# Patient Record
Sex: MALE | Race: WHITE
[De-identification: names, ages, dates, MRNs, and addresses within clinical notes are randomized per-mention and may not be internally consistent; named-entity substitution may affect disease eponyms.]

---

## 2021-03-10 ENCOUNTER — NURSE TRIAGE (OUTPATIENT)
Dept: OTHER | Facility: CLINIC | Age: 31
End: 2021-03-10

## 2021-03-11 NOTE — TELEPHONE ENCOUNTER
Patient called with covid testing related questions. Directed patient to [employer] web site. Care advice provided, patient verbalizes understanding; denies any other questions or concerns; instructed to call back for any new or worsening symptoms. This triage is a result of a call to [address] [place] [address]. Please do not respond to the triage nurse through this encounter. Any subsequent communication should be directly with the patient. Reason for Disposition   General information question, no triage required and triager able to answer question    Answer Assessment - Initial Assessment Questions  1. REASON FOR CALL or QUESTION: \"What is your reason for calling today? \" or \"How can I best help you? \" or \"What question do you have that I can help answer? \"      Covid testing related questions.     Protocols used: INFORMATION ONLY CALL - NO TRIAGE-ADULT-

## 2021-11-29 ENCOUNTER — OFFICE VISIT (OUTPATIENT)
Dept: INTERNAL MEDICINE CLINIC | Age: 31
End: 2021-11-29
Payer: COMMERCIAL

## 2021-11-29 ENCOUNTER — TELEPHONE (OUTPATIENT)
Dept: INTERNAL MEDICINE CLINIC | Age: 31
End: 2021-11-29

## 2021-11-29 ENCOUNTER — NURSE TRIAGE (OUTPATIENT)
Dept: OTHER | Facility: CLINIC | Age: 31
End: 2021-11-29

## 2021-11-29 VITALS
OXYGEN SATURATION: 96 % | WEIGHT: 128.8 LBS | HEIGHT: 71 IN | DIASTOLIC BLOOD PRESSURE: 70 MMHG | TEMPERATURE: 97.9 F | HEART RATE: 92 BPM | SYSTOLIC BLOOD PRESSURE: 130 MMHG | BODY MASS INDEX: 18.03 KG/M2

## 2021-11-29 DIAGNOSIS — F41.9 ANXIETY: Primary | ICD-10-CM

## 2021-11-29 DIAGNOSIS — F41.9 ANXIETY: ICD-10-CM

## 2021-11-29 DIAGNOSIS — R07.9 CHEST PAIN, UNSPECIFIED TYPE: ICD-10-CM

## 2021-11-29 DIAGNOSIS — R07.89 CHEST TIGHTNESS: Primary | ICD-10-CM

## 2021-11-29 PROCEDURE — 93000 ELECTROCARDIOGRAM COMPLETE: CPT | Performed by: HOSPITALIST

## 2021-11-29 PROCEDURE — 99213 OFFICE O/P EST LOW 20 MIN: CPT | Performed by: HOSPITALIST

## 2021-11-29 PROCEDURE — 1036F TOBACCO NON-USER: CPT | Performed by: HOSPITALIST

## 2021-11-29 PROCEDURE — G8419 CALC BMI OUT NRM PARAM NOF/U: HCPCS | Performed by: HOSPITALIST

## 2021-11-29 PROCEDURE — G8484 FLU IMMUNIZE NO ADMIN: HCPCS | Performed by: HOSPITALIST

## 2021-11-29 PROCEDURE — G8427 DOCREV CUR MEDS BY ELIG CLIN: HCPCS | Performed by: HOSPITALIST

## 2021-11-29 RX ORDER — TEMAZEPAM 15 MG/1
15 CAPSULE ORAL NIGHTLY PRN
Qty: 14 CAPSULE | Refills: 0 | Status: SHIPPED
Start: 2021-11-29 | End: 2021-12-02 | Stop reason: ALTCHOICE

## 2021-11-29 SDOH — ECONOMIC STABILITY: FOOD INSECURITY: WITHIN THE PAST 12 MONTHS, THE FOOD YOU BOUGHT JUST DIDN'T LAST AND YOU DIDN'T HAVE MONEY TO GET MORE.: NEVER TRUE

## 2021-11-29 SDOH — ECONOMIC STABILITY: FOOD INSECURITY: WITHIN THE PAST 12 MONTHS, YOU WORRIED THAT YOUR FOOD WOULD RUN OUT BEFORE YOU GOT MONEY TO BUY MORE.: NEVER TRUE

## 2021-11-29 ASSESSMENT — ENCOUNTER SYMPTOMS
VOICE CHANGE: 0
SINUS PRESSURE: 0
ABDOMINAL PAIN: 1
WHEEZING: 0
VOMITING: 0
DIARRHEA: 0
CONSTIPATION: 0
ABDOMINAL DISTENTION: 0
NAUSEA: 1
SINUS PAIN: 0
CHEST TIGHTNESS: 1
BACK PAIN: 0
COUGH: 0
SHORTNESS OF BREATH: 1
SORE THROAT: 0
TROUBLE SWALLOWING: 1
BLOOD IN STOOL: 0

## 2021-11-29 ASSESSMENT — PATIENT HEALTH QUESTIONNAIRE - PHQ9
2. FEELING DOWN, DEPRESSED OR HOPELESS: 0
SUM OF ALL RESPONSES TO PHQ QUESTIONS 1-9: 0
SUM OF ALL RESPONSES TO PHQ QUESTIONS 1-9: 0
SUM OF ALL RESPONSES TO PHQ9 QUESTIONS 1 & 2: 0
1. LITTLE INTEREST OR PLEASURE IN DOING THINGS: 0
SUM OF ALL RESPONSES TO PHQ QUESTIONS 1-9: 0

## 2021-11-29 ASSESSMENT — SOCIAL DETERMINANTS OF HEALTH (SDOH): HOW HARD IS IT FOR YOU TO PAY FOR THE VERY BASICS LIKE FOOD, HOUSING, MEDICAL CARE, AND HEATING?: NOT HARD AT ALL

## 2021-11-29 NOTE — PROGRESS NOTES
[facility] Internal Medicine  Establish care visit   2021    Jill Umaña (:  1990) is a 28 y.o. male, here to establish care. Chief Complaint   Patient presents with   [address]     lower stomach discomfort/anxiety, post covid x 8 weeks         There is no problem list on file for this patient. HPI    -  COVID-19, Pfizer, PF, 30mcg/0.3mL 2021, 2021         -The patient works in [employer] and caught [id number] at a concert 5 weeks ago in [place]. Positive PCR testing 10/19. 10/26, 10/28. He lost sense of taste and smell and was severely fatigued. These sx are better. He has a home pulse ox. Never less than 96. The patient  Has had years of chest tightness triggered by stress. This was acutely worse up to 7/8 in intensity on 10/9 and has been off and on since then. This is a mid-line pain which involves the chest and lower abdomen. Distracting himself with games or projects helps. Guided meditation with an audio-juvencio helps. Maybe precipitated by exertion. ROS  Review of Systems   Constitutional: Positive for appetite change. Negative for activity change, chills, diaphoresis, fatigue, fever and unexpected weight change. HENT: Positive for trouble swallowing (Has problems with large pieces of beef. ). Negative for sinus pressure, sinus pain, sore throat and voice change. Eyes: Negative for visual disturbance. Respiratory: Positive for chest tightness and shortness of breath. Negative for cough and wheezing. Cardiovascular: Positive for palpitations (\"Panicky state. \" ). Negative for chest pain and leg swelling. Gastrointestinal: Positive for abdominal pain (Tightness involves the abdomen.) and nausea (Am nausea, chest-tightness, \"panicky feeling. \"  ). Negative for abdominal distention, blood in stool, constipation, diarrhea and vomiting. Endocrine: Negative for polydipsia and polyphagia.    Genitourinary: Negative for decreased urine volume, difficulty urinating, dysuria and urgency. Musculoskeletal: Negative for back pain, gait problem, joint swelling and myalgias. Neurological: Positive for light-headedness (Panicky) and headaches (During COVID only). Negative for dizziness, seizures and syncope. Psychiatric/Behavioral: Positive for dysphoric mood (\"Mild depression. \"). Negative for agitation, behavioral problems, confusion and suicidal ideas. The patient is nervous/anxious. HISTORIES  No current outpatient medications on file prior to visit. No current facility-administered medications on file prior to visit. No Known Allergies    History reviewed. No pertinent past medical history. There is no problem list on file for this patient. History reviewed. No pertinent surgical history. Social History     Socioeconomic History    Marital status: Single     Spouse name: Not on file    Number of children: Not on file    Years of education: Not on file    Highest education level: Not on file   Occupational History    Not on file   Tobacco Use    Smoking status: Never Smoker    Smokeless tobacco: Never Used   Substance and Sexual Activity    Alcohol use: Yes     Comment: occasionally    Drug use: Not Currently    Sexual activity: Yes     Partners: Female   Other Topics Concern    Not on file   Social History Narrative    Not on file     Social Determinants of Health     Financial Resource Strain: Low Risk     Difficulty of Paying Living Expenses: Not hard at all   Food Insecurity: No Food Insecurity    Worried About [address] in the Last Year: Never true    [address]  in the Last Year: Never true   Transportation Needs:     Lack of Transportation (Medical): Not on file    Lack of Transportation (Non-Medical):  Not on file   Physical Activity:     Days of Exercise per Week: Not on file    Minutes of Exercise per Session: Not on file   Stress:     Feeling of Stress : Not on file Social Connections:     Frequency of Communication with Friends and Family: Not on file    Frequency of Social Gatherings with Friends and Family: Not on file    Attends Druze Services: Not on file    Active Member of Clubs or Organizations: Not on file    Attends Club or Organization Meetings: Not on file    Marital Status: Not on file   Intimate Partner Violence:     Fear of Current or Ex-Partner: Not on file    Emotionally Abused: Not on file    Physically Abused: Not on file    Sexually Abused: Not on file   Housing Stability:     Unable to Pay for Housing in the Last Year: Not on file    Number of [place] in the Last Year: Not on file    Unstable Housing in the Last Year: Not on file        History reviewed. No pertinent family history. PE  Vitals:    11/29/21 1140   BP: 130/70   Site: Left Upper Arm   Position: Sitting   Cuff Size: Medium Adult   Pulse: 92   Temp: 97.9 °F (36.6 °C)   TempSrc: Temporal   SpO2: 96%   Weight: 128 lb 12.8 oz (58.4 kg)   Height: 5' 11\" (1.803 m)     Estimated body mass index is 17.96 kg/m² as calculated from the following:    Height as of this encounter: 5' 11\" (1.803 m). Weight as of this encounter: 128 lb 12.8 oz (58.4 kg). Physical Exam  Vitals reviewed. Constitutional:       General: He is not in acute distress. Appearance: Normal appearance. HENT:      Head: Normocephalic and atraumatic. Mouth/Throat:      Pharynx: Oropharynx is clear. Eyes:      Conjunctiva/sclera: Conjunctivae normal.      Pupils: Pupils are equal, round, and reactive to light. Cardiovascular:      Rate and Rhythm: Normal rate and regular rhythm. Pulses: Normal pulses. Heart sounds: Normal heart sounds. Pulmonary:      Effort: Pulmonary effort is normal. No respiratory distress. Breath sounds: Normal breath sounds. No wheezing or rales. Abdominal:      Palpations: Abdomen is soft. Tenderness: There is no abdominal tenderness.  There is no rebound. Musculoskeletal:         General: No signs of injury. Normal range of motion. Cervical back: Normal range of motion and neck supple. Skin:     General: Skin is warm and dry. Coloration: Skin is not jaundiced. Neurological:      General: No focal deficit present. Mental Status: He is alert and oriented to person, place, and time. Psychiatric:         Behavior: Behavior normal.         Thought Content: Thought content normal.         Judgment: Judgment normal.         Immunization History   Administered Date(s) Administered    COVID-19, Pfizer, PF, 30mcg/0.3mL 04/01/2021, 04/22/2021    DTaP (Infanrix) 06/02/2005    MMR 07/12/2012       Health Maintenance   Topic Date Due    Hepatitis C screen  Never done    Varicella vaccine (1 of 2 - 2-dose childhood series) Never done    HIV screen  Never done    DTaP/Tdap/Td vaccine (2 - Tdap) 06/02/2015    Flu vaccine (1) Never done    COVID-19 Vaccine  Completed    Hepatitis A vaccine  Aged Out    Hepatitis B vaccine  Aged Out    Hib vaccine  Aged Out    Meningococcal (ACWY) vaccine  Aged Out    Pneumococcal 0-64 years Vaccine  Aged Out       PSH, PMH, SH and FH reviewed and noted. Recent and past labs, tests and consults also reviewed. Recent or new meds also reviewed. ASSESSMENT/ PLAN:  1. Chest tightness  - EKG 12 Lead; Future  - ECHO Complete 2D W Doppler W Color; Future    2. Anxiety  - temazepam (RESTORIL) 15 MG capsule; Take 1 capsule by mouth nightly as needed for Sleep for up to 14 days. Dispense: 14 capsule; Refill: 0       Orders Placed This Encounter   Procedures    EKG 12 Lead    ECHO Complete 2D W Doppler W Color     Orders Placed This Encounter   Medications    temazepam (RESTORIL) 15 MG capsule     Sig: Take 1 capsule by mouth nightly as needed for Sleep for up to 14 days. Dispense:  14 capsule     Refill:  0      There are no discontinued medications.      Return in about 1 month (around 12/29/2021) for Anxiety. Fatou Wang MD    This dictation was generated by voice recognition computer software. Although all attempts are made to edit the dictation for accuracy, there may be errors in the transcription that are not intended.

## 2021-11-29 NOTE — TELEPHONE ENCOUNTER
Brief description of triage: having chest tightness for years getting more frequent recently lasts minutes to hours comes and goes no radiation no sob no heart hx had covid 6 weeks ago possible anxiety also    Triage indicates for patient to be seen today     Care advice provided, patient verbalizes understanding; denies any other questions or concerns; instructed to call back for any new or worsening symptoms. This triage is a result of a call to [address] [place] [address]. Please do not respond to the triage nurse through this encounter. Any subsequent communication should be directly with the patient. Reason for Disposition   Chest pain(s) lasting a few seconds persists > 3 days    Answer Assessment - Initial Assessment Questions  1. LOCATION: \"Where does it hurt? \"        Upper chest center    2. RADIATION: \"Does the pain go anywhere else? \" (e.g., into neck, jaw, arms, back)      No    3. ONSET: \"When did the chest pain begin? \" (Minutes, hours or days)       Couple years bur more frequent now    4. PATTERN \"Does the pain come and go, or has it been constant since it started? \"  \"Does it get worse with exertion? \"       Comes and goes    5. DURATION: \"How long does it last\" (e.g., seconds, minutes, hours)      10 min hours    6. SEVERITY: \"How bad is the pain? \"  (e.g., Scale 1-10; mild, moderate, or severe)     - MILD (1-3): doesn't interfere with normal activities      - MODERATE (4-7): interferes with normal activities or awakens from sleep     - SEVERE (8-10): excruciating pain, unable to do any normal activities        3/10    7. CARDIAC RISK FACTORS: \"Do you have any history of heart problems or risk factors for heart disease? \" (e.g., angina, prior heart attack; diabetes, high blood pressure, high cholesterol, smoker, or strong family history of heart disease)      No    8. PULMONARY RISK FACTORS: \"Do you have any history of lung disease? \"  (e.g., blood clots in lung, asthma, emphysema, birth control pills)        9. CAUSE: \"What do you think is causing the chest pain? \"     Side effect of covid or anxiety    10. OTHER SYMPTOMS: \"Do you have any other symptoms? \" (e.g., dizziness, nausea, vomiting, sweating, fever, difficulty breathing, cough)        occas nausea,     11. PREGNANCY: \"Is there any chance you are pregnant? \" \"When was your last menstrual period? \"        N/a    Protocols used: CHEST PAIN-ADULT-[place]

## 2021-11-29 NOTE — PATIENT INSTRUCTIONS
Patient Education        temazepam  Pronunciation:  te MARKUS e maría  Brand:  Restoril  What is the most important information I should know about temazepam?  Temazepam can slow or stop your breathing, especially if you have recently used an opioid medication, alcohol, or other drugs that can slow your breathing. MISUSE OF THIS MEDICINE CAN CAUSE ADDICTION, OVERDOSE, OR DEATH. Keep the medication in a place where others cannot get to it. Do not use if you are pregnant. What is temazepam?  Temazepam is a benzodiazepine (brian-polina-dye-AZE-eh-peen) that is used to treat insomnia (trouble falling or staying asleep). Temazepam may also be used for purposes not listed in this medication guide. What should I discuss with my healthcare provider before taking temazepam?  You should not use this medicine if you are allergic to temazepam or similar medicines (such as alprazolam, diazepam, lorazepam, Ativan, Klonopin, Restoril, Tranxene, Valium, Versed, Xanax, and others). Do not use temazepam if you are pregnant. It could harm the unborn baby. If you use temazepam while you are pregnant, your baby could become dependent on the drug. This can cause life-threatening withdrawal symptoms in the baby after it is born. Babies born dependent on habit-forming medicine may need medical treatment for several weeks. Tell your doctor if you have ever had:  · lung disease or breathing problems;  · depression, mental illness, suicidal thoughts;  · alcoholism or drug addiction; or  · liver or kidney disease. It may not be safe to breastfeed while using this medicine. Ask your doctor about any risk. Temazepam is not approved for use by anyone younger than 25years old. How should I take temazepam?  Follow the directions on your prescription label and read all medication guides. Never use temazepam in larger amounts, or for longer than prescribed. Tell your doctor if you feel an increased urge to use more of this medicine.   Never share temazepam with another person, especially someone with a history of drug abuse or addiction. MISUSE CAN CAUSE ADDICTION, OVERDOSE, OR DEATH. Keep the medication in a place where others cannot get to it. Selling or giving away this medicine is against the law. Take this medicine only when you are getting ready for several hours of sleep. You may fall asleep very quickly after taking the medicine. Call your doctor if your insomnia does not improve after taking temazepam for 7 to 10 nights, or if you have any mood or behavior changes. Insomnia can be a symptom of depression, mental illness, or certain medical conditions. Do not stop using temazepam suddenly or you could have unpleasant withdrawal symptoms. Follow your doctor's instructions about tapering your dose. Store at room temperature away from moisture and heat. Keep track of your medicine. You should be aware if anyone is using it improperly or without a prescription. What happens if I miss a dose? Since temazepam is used when needed, you may not be on a dosing schedule. Take temazepam only when you have time for several hours of sleep. Do not use two doses at one time. What happens if I overdose? Seek emergency medical attention or call the Poison Help line at 1-901.834.8322. Overdose symptoms may include extreme drowsiness, confusion, or coma. What should I avoid while taking temazepam?  Do not drink alcohol. Dangerous side effects or death could occur. You may still feel sleepy the morning after taking this medicine. Wait until you are fully awake before you drive, operate machinery, or do anything that requires you to be awake and alert. Your reactions may be impaired. What are the possible side effects of temazepam?  Temazepam may cause a severe allergic reaction. Get emergency medical help if you have signs of an allergic reaction: hives; difficulty breathing; nausea, vomiting; swelling of your face, lips, tongue, or throat.   Temazepam can slow or stop your breathing, especially if you have recently used an opioid medication, alcohol, or other drugs that can slow your breathing. A person caring for you should seek emergency medical attention if you have weak or shallow breathing, if you are hard to wake up, or if you stop breathing. Call your doctor at once if you have:  · confusion, agitation, hallucinations;  · depressed mood; or  · thoughts of suicide or hurting yourself. Some people using temazepam have engaged in activity such as driving, eating, making phone calls, or having sex and later having no memory of the activity. Tell your doctor if this happens to you. The sedative effects of temazepam may last longer in older adults. Accidental falls are common in elderly patients who take benzodiazepines. Common side effects may include:  · day-time drowsiness or \"hangover\" feeling;  · headache;  · dizziness, tiredness;  · nausea; or  · feeling nervous. This is not a complete list of side effects and others may occur. Call your doctor for medical advice about side effects. You may report side effects to FDA at 3-629-FDA-3426. What other drugs will affect temazepam?  Taking temazepam with other drugs that make you sleepy or slow your breathing can cause dangerous side effects or death. Ask your doctor before using opioid medication, a sleeping pill, a muscle relaxer, or medicine for anxiety or seizures. Other drugs may affect temazepam, including prescription and over-the-counter medicines, vitamins, and herbal products. Tell your doctor about all your current medicines and any medicine you start or stop using. Where can I get more information? Your pharmacist can provide more information about temazepam.  Remember, keep this and all other medicines out of the reach of children, never share your medicines with others, and use this medication only for the indication prescribed.    Every effort has been made to ensure that the information provided by [address] is accurate, up-to-date, and complete, but no guarantee is made to that effect. Drug information contained herein may be time sensitive. [facility] information has been compiled for use by healthcare practitioners and consumers in the [place] and therefore [facility] does not warrant that uses outside of the [place] are appropriate, unless specifically indicated otherwise. [facility]'s drug information does not endorse drugs, diagnose patients or recommend therapy. [facility]'s drug information is an informational resource designed to assist licensed healthcare practitioners in caring for their patients and/or to serve consumers viewing this service as a supplement to, and not a substitute for, the expertise, skill, knowledge and judgment of healthcare practitioners. The absence of a warning for a given drug or drug combination in no way should be construed to indicate that the drug or drug combination is safe, effective or appropriate for any given patient. [facility] does not assume any responsibility for any aspect of healthcare administered with the aid of information [facility] provides. The information contained herein is not intended to cover all possible uses, directions, precautions, warnings, drug interactions, allergic reactions, or adverse effects. If you have questions about the drugs you are taking, check with your doctor, nurse or pharmacist.  Copyright 4792-9857 [facility] [place]. Version: 11.02. Revision date: 1/5/2021. Care instructions adapted under license by [facility] ([facility]). If you have questions about a medical condition or this instruction, always ask your healthcare professional. [place] [address] any warranty or liability for your use of this information.

## 2021-11-29 NOTE — TELEPHONE ENCOUNTER
Patient called in stating that his insurance company [employer] told him he needed a PA for the temazepam (RESTORIL) 15 MG capsule or can he substitute it for Lorazepam.       Pls call and advise

## 2021-11-30 ENCOUNTER — PATIENT MESSAGE (OUTPATIENT)
Dept: INTERNAL MEDICINE CLINIC | Age: 31
End: 2021-11-30

## 2021-11-30 DIAGNOSIS — R06.02 SHORTNESS OF BREATH: Primary | ICD-10-CM

## 2021-11-30 NOTE — TELEPHONE ENCOUNTER
PA submitted via [facility] for Temazepam 15MG capsules.   Key: [id number] - PA Case ID: [id number]    STATUS: PENDING

## 2021-12-01 NOTE — TELEPHONE ENCOUNTER
Unfortunately, this would be the least costly way to structurally assess your heart. The EKG you had in the office is normal, and your symptoms are not typical for cardiac disease. You are relatively young in age as well. We could together decide to just monitor your symptoms and only get a test if they are persistent and severe.

## 2021-12-01 NOTE — TELEPHONE ENCOUNTER
Yes.  Please switch to Ativan 0.5 mg p.o. nightly x14 days. Dispense #14. No refill. Please enter order and I will sign.

## 2021-12-01 NOTE — TELEPHONE ENCOUNTER
From: Julieta Fees  Sent: 12/1/2021 8:58 AM EST  To: [facility] Support  Subject: Chest/Stomach Tightness Follow Up    Hi Dr Mariann Higgins,     I called in to [facility] and got pricing on the Echo Complete 2D Doppler w Color, using CPT Code 84355 and with my insurance they said its going to cost me $3,485 out of pocket to have the procedure done. As that a lot of money to me I wanted to see if there are any other less costly procedures that would be able to observe the potential issues we might be concerned about? What would be the short list of potential problems we are looking for based on the background / symptoms I've presented? Sorry for bringing up this issue, I know its not your job to manage the financial aspect of care, but Covid has been tough for the [employer] industry and I don't want to take on the financial stress if there might be an alternative.      Thank you very much,  NIKO

## 2021-12-01 NOTE — TELEPHONE ENCOUNTER
Received DENIAL for Temazepam 15MG capsules. Denial letter attached. Please advise patient. Thank you.

## 2021-12-02 ENCOUNTER — TELEPHONE (OUTPATIENT)
Dept: INTERNAL MEDICINE CLINIC | Age: 31
End: 2021-12-02

## 2021-12-02 RX ORDER — LORAZEPAM 0.5 MG/1
0.5 TABLET ORAL NIGHTLY PRN
Qty: 14 TABLET | Refills: 0 | OUTPATIENT
Start: 2021-12-02 | End: 2021-12-16

## 2021-12-02 RX ORDER — ALBUTEROL SULFATE 90 UG/1
1 AEROSOL, METERED RESPIRATORY (INHALATION) EVERY 6 HOURS PRN
Qty: 18 G | Refills: 0 | Status: SHIPPED | OUTPATIENT
Start: 2021-12-02

## 2021-12-02 NOTE — TELEPHONE ENCOUNTER
Its ok to exercise and I believe this would improve your symptoms. The Ativan should be helpful as well.

## 2021-12-02 NOTE — TELEPHONE ENCOUNTER
Catherine Nickerson MD 30 minutes ago (2:51 PM)     Oriana Leblanc. Thank you. Let me think on it for a bit.       My lungs are continuing to feel heavy and short of breath.       Is there anything I can do for relief? My O2 levels are generally staying above 96%.       Should I be trying to take walks / light exercise at all?

## 2021-12-20 ENCOUNTER — OFFICE VISIT (OUTPATIENT)
Dept: INTERNAL MEDICINE CLINIC | Age: 31
End: 2021-12-20
Payer: COMMERCIAL

## 2021-12-20 VITALS
TEMPERATURE: 98.4 F | OXYGEN SATURATION: 99 % | HEART RATE: 90 BPM | WEIGHT: 135 LBS | SYSTOLIC BLOOD PRESSURE: 122 MMHG | BODY MASS INDEX: 18.9 KG/M2 | HEIGHT: 71 IN | DIASTOLIC BLOOD PRESSURE: 70 MMHG

## 2021-12-20 DIAGNOSIS — R07.9 CHEST PAIN, UNSPECIFIED TYPE: Primary | ICD-10-CM

## 2021-12-20 PROCEDURE — G8420 CALC BMI NORM PARAMETERS: HCPCS | Performed by: HOSPITALIST

## 2021-12-20 PROCEDURE — 99213 OFFICE O/P EST LOW 20 MIN: CPT | Performed by: HOSPITALIST

## 2021-12-20 PROCEDURE — G8427 DOCREV CUR MEDS BY ELIG CLIN: HCPCS | Performed by: HOSPITALIST

## 2021-12-20 PROCEDURE — 1036F TOBACCO NON-USER: CPT | Performed by: HOSPITALIST

## 2021-12-20 PROCEDURE — G8484 FLU IMMUNIZE NO ADMIN: HCPCS | Performed by: HOSPITALIST

## 2021-12-20 ASSESSMENT — ENCOUNTER SYMPTOMS
SHORTNESS OF BREATH: 0
SINUS PAIN: 0
BLOOD IN STOOL: 0
DIARRHEA: 0
TROUBLE SWALLOWING: 0
VOICE CHANGE: 0
WHEEZING: 0
BACK PAIN: 0
CONSTIPATION: 0
SORE THROAT: 0
CHEST TIGHTNESS: 0
ABDOMINAL PAIN: 0
COUGH: 0
ABDOMINAL DISTENTION: 0
SINUS PRESSURE: 0
VOMITING: 0
NAUSEA: 0

## 2021-12-20 NOTE — PROGRESS NOTES
[facility] Internal Medicine  Follow-up care visit   2021    Patient:  Bharat Tavarez                                               : 1990  Age: 28 y.o. MRN: <[id number]>  Date : 2021    Bharat Tavarez is a 28 y.o. male who presents for :    Chest pain. His sx have resolved. He did not use Lorazepam.  Inhaler helped. Chief Complaint   Patient presents with    Anxiety    Follow-up     f/u from covid        Review of Systems   Constitutional: Negative for activity change, appetite change, chills, diaphoresis, fatigue, fever and unexpected weight change. HENT: Negative for sinus pressure, sinus pain, sore throat, trouble swallowing and voice change. Eyes: Negative for visual disturbance. Respiratory: Negative for cough, chest tightness, shortness of breath and wheezing. Cardiovascular: Negative for chest pain, palpitations and leg swelling. Gastrointestinal: Negative for abdominal distention, abdominal pain, blood in stool, constipation, diarrhea, nausea and vomiting. Endocrine: Negative for polydipsia and polyphagia. Genitourinary: Negative for decreased urine volume, difficulty urinating, dysuria and urgency. Musculoskeletal: Negative for back pain, gait problem, joint swelling and myalgias. Neurological: Negative for dizziness, seizures, syncope, light-headedness and headaches. Psychiatric/Behavioral: Negative for agitation, behavioral problems, confusion and suicidal ideas. No past medical history on file. No past surgical history on file. Current Outpatient Medications   Medication Sig Dispense Refill    albuterol sulfate HFA (PROVENTIL HFA) 108 (90 Base) MCG/ACT inhaler Inhale 1 puff into the lungs every 6 hours as needed for Wheezing or Shortness of Breath 18 g 0     No current facility-administered medications for this visit.        /70 (Site: Right Upper Arm, Position: Sitting, Cuff Size: Small Adult)   Pulse 90   Temp 98.4 °F (36.9 °C) (Infrared)   Ht 5' 11\" (1.803 m)   Wt 135 lb (61.2 kg)   SpO2 99%   BMI 18.83 kg/m²     Physical Exam   Physical Exam  Vitals reviewed. Constitutional:       General: He is not in acute distress. Appearance: Normal appearance. HENT:      Head: Normocephalic and atraumatic. Mouth/Throat:      Pharynx: Oropharynx is clear. Eyes:      Conjunctiva/sclera: Conjunctivae normal.      Pupils: Pupils are equal, round, and reactive to light. Cardiovascular:      Rate and Rhythm: Normal rate and regular rhythm. Pulses: Normal pulses. Heart sounds: Normal heart sounds. Pulmonary:      Effort: Pulmonary effort is normal. No respiratory distress. Breath sounds: Normal breath sounds. No wheezing or rales. Abdominal:      Palpations: Abdomen is soft. Tenderness: There is no abdominal tenderness. There is no rebound. Musculoskeletal:         General: No signs of injury. Normal range of motion. Cervical back: Normal range of motion and neck supple. Skin:     General: Skin is warm and dry. Coloration: Skin is not jaundiced. Neurological:      General: No focal deficit present. Mental Status: He is alert and oriented to person, place, and time. Psychiatric:         Behavior: Behavior normal.         Thought Content: Thought content normal.         Judgment: Judgment normal.         Assessment/ plan:     1. Chest pain, unspecified type  -Resolved. Return in about 1 year (around 12/20/2022).     Tiffany Guzman MD

## 2022-01-03 ENCOUNTER — TELEPHONE (OUTPATIENT)
Dept: INTERNAL MEDICINE CLINIC | Age: 32
End: 2022-01-03

## 2022-01-03 NOTE — TELEPHONE ENCOUNTER
Patient asked for a referral to [facility] pulmonology     Fax: [phone]  Phone [phone]  This request is following up to a message on [employer] he stated to disregard it and to follow this message     Please call to advise

## 2022-01-05 ENCOUNTER — OFFICE VISIT (OUTPATIENT)
Dept: PULMONOLOGY | Age: 32
End: 2022-01-05

## 2022-01-05 VITALS
DIASTOLIC BLOOD PRESSURE: 79 MMHG | HEART RATE: 88 BPM | OXYGEN SATURATION: 96 % | WEIGHT: 134.2 LBS | SYSTOLIC BLOOD PRESSURE: 141 MMHG | BODY MASS INDEX: 18.79 KG/M2 | TEMPERATURE: 96.6 F | HEIGHT: 71 IN

## 2022-01-05 DIAGNOSIS — G89.29 COVID-19 LONG HAULER MANIFESTING CHRONIC MUSCLE PAIN: ICD-10-CM

## 2022-01-05 DIAGNOSIS — R07.89 OTHER CHEST PAIN: Primary | ICD-10-CM

## 2022-01-05 DIAGNOSIS — M79.10 COVID-19 LONG HAULER MANIFESTING CHRONIC MUSCLE PAIN: ICD-10-CM

## 2022-01-05 DIAGNOSIS — U09.9 COVID-19 LONG HAULER MANIFESTING CHRONIC MUSCLE PAIN: ICD-10-CM

## 2022-01-05 PROCEDURE — 99203 OFFICE O/P NEW LOW 30 MIN: CPT | Performed by: INTERNAL MEDICINE

## 2022-01-05 RX ORDER — FAMOTIDINE 40 MG/1
40 TABLET, FILM COATED ORAL DAILY
COMMUNITY

## 2022-01-05 NOTE — PROGRESS NOTES
Meredith Wright (:  1990) is a 28 y.o. male,New patient, here for evaluation of the following chief complaint(s):  New Patient (self referral, SOB, chest tightness, Covid Long hauler)         ASSESSMENT/PLAN:  1. Other chest pain  2. 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  He has good respiratory functional status, able to tolerate physical exercise and does not have any O2 desaturation. There may be some element of anxiety, prolonging, but not necessarily provoking, symptoms. Ibuprofen or other NSAID may be helpful in relieving discomfort. Meditative breathing, with which he is familiar, may be helpful. He should continue exercise habits, and can gradually increase the duration and intensity is as tolerates. He understands that symptoms are expected to gradually improve, but there is no exact timeline. Note that he had a booster vaccine last week. Return if symptoms worsen or fail to improve. Subjective   SUBJECTIVE/OBJECTIVE:  HPI: Mr. Deejay Villavicencio presents with complaint of chest discomfort about 2 months duration, since Covid infection in October. He has had a Covid vaccine ([facility]) in April. In early October, he worked at an event in [place]. After returning, he developed fever, malaise, anosmia and was quite ill for about 7-10 days. No cough, dyspnea. He had a pulse oximeter, and never had O2Hgb <95%. About one month later, he worked at an event in [place]. He  had difficulty there, becoming short of breath with the physical work, to the point of having nausea/vomiting in response. Otherwise, he has not had respite for a limitation. Whereas, prior to his illness, he regularly engaged in physical activities, he has been slow to return to exercise. He has been walking 30 minutes and riding a stationary bicycle daily, and has done well.   In fact, he does not experience any chest discomfort and does not have any dyspnea with exercise. He has some difficulty describing the chest discomfort he experiences. It is not a sensation of pain, not exactly a sense that he needs to take a deep breath. In fact, breathing deeply makes it feel worse. Discomfort develops at rest, perhaps most noticeable when recumbent for sleep. He does not have any GI symptoms, taste is normal, appetite is very good. Weight has not changed, he has always been thin. He was given Rx for lorazepam for nocturnal use with chest discomfort, but has not taken that. He was also given an albuterol inhaler. He has used that several times, found it only partially helpful. He is a non-smoker. Has no prior hx of respiratory illness, including childhood asthma and frequent respiratory infections. PMH is unremarkable. Objective   Physical Exam  Constitutional:       Appearance: He is well-developed and underweight. HENT:      Head: Normocephalic and atraumatic. Mouth/Throat:      Pharynx: No oropharyngeal exudate. Eyes:      General: No scleral icterus. Right eye: No discharge. Conjunctiva/sclera: Conjunctivae normal.   Neck:      Thyroid: No thyromegaly. Trachea: No tracheal deviation. Cardiovascular:      Rate and Rhythm: Normal rate and regular rhythm. Pulses:           Radial pulses are 2+ on the right side and 2+ on the left side. Heart sounds: Normal heart sounds. No murmur heard. Pulmonary:      Effort: Pulmonary effort is normal. No respiratory distress. Breath sounds: Normal breath sounds. No stridor. No wheezing, rhonchi or rales. Musculoskeletal:      Right lower leg: No edema. Left lower leg: No edema. Lymphadenopathy:      Cervical: No cervical adenopathy. Skin:     General: Skin is warm and dry. Neurological:      Mental Status: He is alert and oriented to person, place, and time.    Psychiatric:         Mood and Affect: Mood normal.